# Patient Record
(demographics unavailable — no encounter records)

---

## 2024-12-03 NOTE — HISTORY OF PRESENT ILLNESS
[FreeTextEntry1] : Referred by Dr. Lira  34 yo  at 5w5d by LMP 10/24 presenting for manual vacuum aspiration in the office for induced   Pt's  is scheduled for vasectomy on Friday. Plans to use condoms until semenanalysis.  ALL: NKDA MEDS: denies PMH: denies PSH: D+C  OBHx:   x2, , , missed AB in  s/p D+C GynHx: denies Social: denies   Pt aware of options of expectant management/pregnancy continuation, medical management, office procedure with local anesthesia or OR procedure with IV sedation. Pt opting for office procedure.   MARIAELENA Counseling.   Risks of MARIAELENA includin. Infection: Patient was counseled on risk of infection and the use of prophylactic antibiotics, signs/symptoms of pre- and post-operative infection were reviewed. 2. Hemorrhage: Patient was counseled on the risk of hemorrhage, possibly requiring blood (and/or blood products) transfusion, management including use of but not limited to uterotonic medications. PT HAS NO OBJECTIONS TO BLOOD TRANSFUSION OR RECEIVING BLOOD PRODUCTS. 3. Injury/Perforation:  Risk of injury to vagina, cervix, uterus reviewed. Patient was counseled on the risk of uterine perforation with/without need for laparoscopy/laparotomy with/without injury to adjacent organs such as bowel/bladder. 4.            Risk of retained products of conception  with/without need for medication or suction procedure to empty the uterus.   The patient also understands it is their responsibility to bring to the attention of their physician any unusual symptoms following the  and to report to follow-up examinations.    They are sure of their decision and deny any coercion from family, friends or healthcare providers. The patient had the opportunity to ask questions and all questions were answered.

## 2024-12-03 NOTE — PROCEDURE
[Transvaginal OB Sonogram] : Transvaginal OB Sonogram [Intrauterine Pregnancy] : intrauterine pregnancy [Yolk Sac] : yolk sac present [Date: ___] : Date: [unfilled] [Current GA by Sonogram: ___ (wks)] : Current GA by Sonogram: [unfilled]Uwks [___ day(s)] : [unfilled] days [Transvaginal OB Sonogram WNL] : Transvaginal OB Sonogram WNL [Fetal Heart] : no fetal heart [FreeTextEntry1] : EDC consistent with LMP

## 2024-12-03 NOTE — PLAN
[FreeTextEntry1] : 32 yo s/p office MARIAELENA for  doing well.    1. s/p office MARIAELENA - All available medical records reviewed - All consents signed today, all questions/concerns addressed - pt does not desire medical management - Patient offered pamphlet for support services     2. ID/Neuro - doxycycline 200 mg Rx sent to pharmacy - Reviewed Tylenol 975mg -1000mg q6 hours -  Ibuprofen 600 mg po q 6 prn- Rx sent to pharmacy   3. Labs/Blood type  - No hx of anemia - RH testing not indicated   4. Contraception/Conception - aware of rapid return to fertility - condoms/partner vasectomy   5. Post-op - Post-operative follow-up phone call virtual visit to be scheduled in 2 weeks - pre- and Post-operative instruction sheet given, reviewed bleeding and infection precautions - Provided 24 hour contact phone number - All questions/concerns of patient addressed to their satisfaction

## 2024-12-03 NOTE — PHYSICAL EXAM
[Chaperone Present] : A chaperone was present in the examining room during all aspects of the physical examination [Appropriately responsive] : appropriately responsive [Alert] : alert [No Acute Distress] : no acute distress [Soft] : soft [Non-tender] : non-tender [Non-distended] : non-distended [Oriented x3] : oriented x3 [FreeTextEntry2] : NSEDA LPN